# Patient Record
Sex: FEMALE | Race: WHITE | NOT HISPANIC OR LATINO
[De-identification: names, ages, dates, MRNs, and addresses within clinical notes are randomized per-mention and may not be internally consistent; named-entity substitution may affect disease eponyms.]

---

## 2017-08-07 ENCOUNTER — RESULT CHARGE (OUTPATIENT)
Age: 52
End: 2017-08-07

## 2017-08-08 ENCOUNTER — APPOINTMENT (OUTPATIENT)
Dept: FAMILY MEDICINE | Facility: CLINIC | Age: 52
End: 2017-08-08
Payer: COMMERCIAL

## 2017-08-08 ENCOUNTER — NON-APPOINTMENT (OUTPATIENT)
Age: 52
End: 2017-08-08

## 2017-08-08 VITALS
HEIGHT: 63 IN | SYSTOLIC BLOOD PRESSURE: 110 MMHG | DIASTOLIC BLOOD PRESSURE: 80 MMHG | OXYGEN SATURATION: 99 % | HEART RATE: 61 BPM | BODY MASS INDEX: 26.93 KG/M2 | WEIGHT: 152 LBS

## 2017-08-08 DIAGNOSIS — Z78.9 OTHER SPECIFIED HEALTH STATUS: ICD-10-CM

## 2017-08-08 DIAGNOSIS — Z82.0 FAMILY HISTORY OF EPILEPSY AND OTHER DISEASES OF THE NERVOUS SYSTEM: ICD-10-CM

## 2017-08-08 DIAGNOSIS — Z82.49 FAMILY HISTORY OF ISCHEMIC HEART DISEASE AND OTHER DISEASES OF THE CIRCULATORY SYSTEM: ICD-10-CM

## 2017-08-08 PROCEDURE — 93000 ELECTROCARDIOGRAM COMPLETE: CPT

## 2017-08-08 PROCEDURE — 99386 PREV VISIT NEW AGE 40-64: CPT | Mod: 25

## 2017-08-08 RX ORDER — CHOLECALCIFEROL (VITAMIN D3) 1250 MCG
1.25 MG CAPSULE ORAL
Qty: 4 | Refills: 0 | Status: ACTIVE | COMMUNITY

## 2017-08-08 RX ORDER — LEVOTHYROXINE SODIUM 100 UG/1
100 TABLET ORAL DAILY
Qty: 90 | Refills: 3 | Status: ACTIVE | COMMUNITY

## 2017-08-08 RX ORDER — LIOTHYRONINE SODIUM 5 UG/1
5 TABLET ORAL DAILY
Refills: 0 | Status: ACTIVE | COMMUNITY

## 2017-12-20 ENCOUNTER — APPOINTMENT (OUTPATIENT)
Dept: FAMILY MEDICINE | Facility: CLINIC | Age: 52
End: 2017-12-20
Payer: COMMERCIAL

## 2017-12-20 VITALS
DIASTOLIC BLOOD PRESSURE: 80 MMHG | SYSTOLIC BLOOD PRESSURE: 120 MMHG | HEIGHT: 63 IN | WEIGHT: 152 LBS | BODY MASS INDEX: 26.93 KG/M2

## 2017-12-20 DIAGNOSIS — T75.3XXA MOTION SICKNESS, INITIAL ENCOUNTER: ICD-10-CM

## 2017-12-20 PROCEDURE — 99213 OFFICE O/P EST LOW 20 MIN: CPT

## 2018-01-02 ENCOUNTER — TRANSCRIPTION ENCOUNTER (OUTPATIENT)
Age: 53
End: 2018-01-02

## 2018-10-21 ENCOUNTER — TRANSCRIPTION ENCOUNTER (OUTPATIENT)
Age: 53
End: 2018-10-21

## 2018-11-05 ENCOUNTER — APPOINTMENT (OUTPATIENT)
Dept: FAMILY MEDICINE | Facility: CLINIC | Age: 53
End: 2018-11-05
Payer: COMMERCIAL

## 2018-11-05 VITALS
WEIGHT: 155 LBS | HEIGHT: 63 IN | DIASTOLIC BLOOD PRESSURE: 80 MMHG | TEMPERATURE: 98 F | BODY MASS INDEX: 27.46 KG/M2 | SYSTOLIC BLOOD PRESSURE: 116 MMHG

## 2018-11-05 DIAGNOSIS — K21.9 GASTRO-ESOPHAGEAL REFLUX DISEASE W/OUT ESOPHAGITIS: ICD-10-CM

## 2018-11-05 PROCEDURE — 99213 OFFICE O/P EST LOW 20 MIN: CPT

## 2018-11-05 RX ORDER — SCOPOLAMINE 1.5 MG/1
1 PATCH, EXTENDED RELEASE TRANSDERMAL
Qty: 5 | Refills: 0 | Status: DISCONTINUED | COMMUNITY
Start: 2017-12-20 | End: 2018-11-05

## 2018-11-05 NOTE — PLAN
[FreeTextEntry1] : Sinusitis- start zpak, flonase and decongestants and supportive measures\par Epigastric pain possibly secondary to GERD. If pain does not self resolve in 1-2 weeks start ranitidine daily

## 2018-11-05 NOTE — REVIEW OF SYSTEMS
[Fatigue] : fatigue [Earache] : earache [Nasal Discharge] : nasal discharge [Sore Throat] : sore throat [Postnasal Drip] : postnasal drip [Fever] : no fever [Chills] : no chills [Chest Pain] : no chest pain [Palpitations] : no palpitations [Shortness Of Breath] : no shortness of breath [Wheezing] : no wheezing [Headache] : no headache [Dizziness] : no dizziness

## 2018-11-05 NOTE — HISTORY OF PRESENT ILLNESS
[FreeTextEntry8] : 1 week history of not feeling well associated with headache, facial pressure, ear pain and  sore throat. Also c/o epigastric pain, no hx of gerd. Denies association with food, admits to drinking coffee.  Denies fever, chills. States she is not feeling herself.

## 2018-11-05 NOTE — PHYSICAL EXAM
[No Acute Distress] : no acute distress [Well-Appearing] : well-appearing [Normal Outer Ear/Nose] : the outer ears and nose were normal in appearance [Supple] : supple [No Respiratory Distress] : no respiratory distress  [Clear to Auscultation] : lungs were clear to auscultation bilaterally [Normal Rate] : normal rate  [Regular Rhythm] : with a regular rhythm [Soft] : abdomen soft [Non Tender] : non-tender [Non-distended] : non-distended [Normal Bowel Sounds] : normal bowel sounds [Normal Affect] : the affect was normal [Normal Insight/Judgement] : insight and judgment were intact [de-identified] : + pain on palpation to frontal and maxillary sinuses, + post nasal drip

## 2018-12-10 ENCOUNTER — RX RENEWAL (OUTPATIENT)
Age: 53
End: 2018-12-10

## 2019-03-25 ENCOUNTER — APPOINTMENT (OUTPATIENT)
Dept: FAMILY MEDICINE | Facility: CLINIC | Age: 54
End: 2019-03-25
Payer: COMMERCIAL

## 2019-03-25 VITALS
HEIGHT: 63 IN | SYSTOLIC BLOOD PRESSURE: 124 MMHG | DIASTOLIC BLOOD PRESSURE: 78 MMHG | BODY MASS INDEX: 27.46 KG/M2 | RESPIRATION RATE: 16 BRPM | TEMPERATURE: 98.8 F | OXYGEN SATURATION: 99 % | HEART RATE: 116 BPM | WEIGHT: 155 LBS

## 2019-03-25 DIAGNOSIS — L08.9 BLISTER (NONTHERMAL), UNSPECIFIED FOOT, INITIAL ENCOUNTER: ICD-10-CM

## 2019-03-25 DIAGNOSIS — S90.829A BLISTER (NONTHERMAL), UNSPECIFIED FOOT, INITIAL ENCOUNTER: ICD-10-CM

## 2019-03-25 PROCEDURE — 99213 OFFICE O/P EST LOW 20 MIN: CPT

## 2019-03-25 NOTE — HISTORY OF PRESENT ILLNESS
[FreeTextEntry8] : wearing poor fitting shoes had area of breakdown top of right foot which has not healed local redness swelling

## 2019-06-09 ENCOUNTER — TRANSCRIPTION ENCOUNTER (OUTPATIENT)
Age: 54
End: 2019-06-09

## 2019-07-01 ENCOUNTER — NON-APPOINTMENT (OUTPATIENT)
Age: 54
End: 2019-07-01

## 2019-07-01 ENCOUNTER — APPOINTMENT (OUTPATIENT)
Dept: FAMILY MEDICINE | Facility: CLINIC | Age: 54
End: 2019-07-01
Payer: COMMERCIAL

## 2019-07-01 ENCOUNTER — TRANSCRIPTION ENCOUNTER (OUTPATIENT)
Age: 54
End: 2019-07-01

## 2019-07-01 VITALS
HEART RATE: 66 BPM | OXYGEN SATURATION: 98 % | SYSTOLIC BLOOD PRESSURE: 124 MMHG | BODY MASS INDEX: 28.39 KG/M2 | HEIGHT: 63 IN | WEIGHT: 160.25 LBS | DIASTOLIC BLOOD PRESSURE: 70 MMHG

## 2019-07-01 DIAGNOSIS — E06.3 AUTOIMMUNE THYROIDITIS: ICD-10-CM

## 2019-07-01 DIAGNOSIS — F40.243 FEAR OF FLYING: ICD-10-CM

## 2019-07-01 DIAGNOSIS — Z00.00 ENCOUNTER FOR GENERAL ADULT MEDICAL EXAMINATION W/OUT ABNORMAL FINDINGS: ICD-10-CM

## 2019-07-01 PROCEDURE — 93000 ELECTROCARDIOGRAM COMPLETE: CPT

## 2019-07-01 PROCEDURE — 99396 PREV VISIT EST AGE 40-64: CPT | Mod: 25

## 2019-07-01 PROCEDURE — G0444 DEPRESSION SCREEN ANNUAL: CPT

## 2019-07-01 RX ORDER — FLUTICASONE PROPIONATE 50 UG/1
50 SPRAY, METERED NASAL DAILY
Qty: 1 | Refills: 0 | Status: DISCONTINUED | COMMUNITY
Start: 2018-11-05 | End: 2019-07-01

## 2019-07-01 RX ORDER — RANITIDINE 150 MG/1
150 TABLET ORAL
Qty: 30 | Refills: 0 | Status: DISCONTINUED | COMMUNITY
Start: 2018-11-05 | End: 2019-07-01

## 2019-07-01 RX ORDER — AZITHROMYCIN 250 MG/1
250 TABLET, FILM COATED ORAL DAILY
Qty: 1 | Refills: 0 | Status: DISCONTINUED | COMMUNITY
Start: 2018-11-05 | End: 2019-07-01

## 2019-07-01 RX ORDER — CEFADROXIL 500 MG/1
500 CAPSULE ORAL TWICE DAILY
Qty: 20 | Refills: 0 | Status: DISCONTINUED | COMMUNITY
Start: 2019-03-25 | End: 2019-07-01

## 2019-07-01 NOTE — ASSESSMENT
[FreeTextEntry1] : Left shoulder pain may be combination of some cervical radiculopathy and impingement syndrome physical therapy prescribed.\par \par Colonoscopy prescribed.\par \par Xanax for air travel

## 2019-07-01 NOTE — PHYSICAL EXAM
[No Acute Distress] : no acute distress [No Lymphadenopathy] : no lymphadenopathy [Thyroid Normal, No Nodules] : the thyroid was normal and there were no nodules present [Clear to Auscultation] : lungs were clear to auscultation bilaterally [Regular Rhythm] : with a regular rhythm [No Carotid Bruits] : no carotid bruits [No Edema] : there was no peripheral edema [Soft] : abdomen soft [No HSM] : no HSM [Normal Posterior Cervical Nodes] : no posterior cervical lymphadenopathy [Normal Anterior Cervical Nodes] : no anterior cervical lymphadenopathy [No Rash] : no rash [Normal Insight/Judgement] : insight and judgment were intact [de-identified] : Examination . Left shoulder normal [de-identified] : Decrease pronator deep tendon reflex left arm

## 2019-07-01 NOTE — HISTORY OF PRESENT ILLNESS
[FreeTextEntry1] : Yearly [de-identified] : Followed by endocrinology for hypothyroidism. Dose recently adjusted by Dr. Nielson.\par \par History of right shoulder surgery, now developing left shoulder discomfort. Good range of motion of shoulder pain sometimes radiates down to lateral epicondyles and wrist. Does not seem to be associated with neck problems.\par \par Elevated triglycerides on a nonfasting blood sample\par \par Uses Xanax for air travel

## 2019-07-01 NOTE — HEALTH RISK ASSESSMENT
[Excellent] : ~his/her~  mood as  excellent [No] : No [0] : 2) Feeling down, depressed, or hopeless: Not at all (0) [Change in mental status noted] : No change in mental status noted [Fully functional (bathing, dressing, toileting, transferring, walking, feeding)] : Fully functional (bathing, dressing, toileting, transferring, walking, feeding) [Fully functional (using the telephone, shopping, preparing meals, housekeeping, doing laundry, using] : Fully functional and needs no help or supervision to perform IADLs (using the telephone, shopping, preparing meals, housekeeping, doing laundry, using transportation, managing medications and managing finances) [ColonoscopyComments] : Colonoscopy ordered

## 2019-07-02 LAB
CHOLEST SERPL-MCNC: 197 MG/DL
CHOLEST/HDLC SERPL: 4.4 RATIO
HDLC SERPL-MCNC: 45 MG/DL
LDLC SERPL CALC-MCNC: 124 MG/DL
TRIGL SERPL-MCNC: 141 MG/DL

## 2019-09-09 ENCOUNTER — TRANSCRIPTION ENCOUNTER (OUTPATIENT)
Age: 54
End: 2019-09-09

## 2019-09-12 ENCOUNTER — APPOINTMENT (OUTPATIENT)
Dept: FAMILY MEDICINE | Facility: CLINIC | Age: 54
End: 2019-09-12
Payer: OTHER MISCELLANEOUS

## 2019-09-12 VITALS
DIASTOLIC BLOOD PRESSURE: 70 MMHG | WEIGHT: 160 LBS | OXYGEN SATURATION: 99 % | SYSTOLIC BLOOD PRESSURE: 110 MMHG | HEIGHT: 63 IN | TEMPERATURE: 98.7 F | RESPIRATION RATE: 16 BRPM | HEART RATE: 86 BPM | BODY MASS INDEX: 28.35 KG/M2

## 2019-09-12 DIAGNOSIS — R58 HEMORRHAGE, NOT ELSEWHERE CLASSIFIED: ICD-10-CM

## 2019-09-12 PROCEDURE — 99203 OFFICE O/P NEW LOW 30 MIN: CPT

## 2019-09-15 RX ORDER — OXYCODONE AND ACETAMINOPHEN 5; 325 MG/1; MG/1
5-325 TABLET ORAL
Qty: 12 | Refills: 0 | Status: COMPLETED | COMMUNITY
Start: 2019-06-09

## 2019-09-15 RX ORDER — ICOSAPENT ETHYL 1000 MG/1
1 CAPSULE ORAL
Qty: 360 | Refills: 0 | Status: COMPLETED | COMMUNITY
Start: 2019-06-10

## 2019-09-15 RX ORDER — PHENTERMINE AND TOPIRAMATE 15; 92 MG/1; MG/1
15-92 CAPSULE, EXTENDED RELEASE ORAL
Qty: 30 | Refills: 0 | Status: COMPLETED | COMMUNITY
Start: 2019-02-13

## 2019-09-15 RX ORDER — ONDANSETRON 4 MG/1
4 TABLET, ORALLY DISINTEGRATING ORAL
Qty: 10 | Refills: 0 | Status: COMPLETED | COMMUNITY
Start: 2019-07-22

## 2019-09-15 RX ORDER — ERGOCALCIFEROL 1.25 MG/1
1.25 MG CAPSULE, LIQUID FILLED ORAL
Qty: 12 | Refills: 0 | Status: COMPLETED | COMMUNITY
Start: 2019-04-04

## 2019-09-15 RX ORDER — LEVOTHYROXINE SODIUM 112 UG/1
112 TABLET ORAL
Qty: 90 | Refills: 0 | Status: COMPLETED | COMMUNITY
Start: 2019-04-10

## 2019-09-15 RX ORDER — VALACYCLOVIR 1 G/1
1 TABLET, FILM COATED ORAL
Qty: 21 | Refills: 0 | Status: COMPLETED | COMMUNITY
Start: 2019-06-09

## 2019-09-15 NOTE — HISTORY OF PRESENT ILLNESS
[FreeTextEntry8] : Pt for new w/c case. DOI 9/9/19, pt was going down stairs in school building during a fire drill and fell forward from losing her footing. Pt was holding onto banister w/ L hand and did not let go of banister leading to hyp extension of arm. Pt also twisted while falling and hit R lower leg. Pt now c/o neck, upper back, anterior chest pain and b/l rib pain. Pt went to urgent care that day who started pt on ibuprofen 800mg. Pt has been taking it at night. Pain has worsened, pt having worse pain whenrotation torso or neck or taking deep breaths.. CXR was wnl at urgent care.

## 2019-09-15 NOTE — PHYSICAL EXAM
[No Lymphadenopathy] : no lymphadenopathy [Normal] : soft, non-tender, non-distended, no masses palpated, no HSM and normal bowel sounds [de-identified] : anterior chest wall and b/l anterolateral lower rib tenderness, +neck muscle spasms b/l [de-identified] : ecchymoses on RLE and R arm

## 2019-09-15 NOTE — ASSESSMENT
[FreeTextEntry1] : costochondritis, back spasms, ecchymosis - advised warm compresses, start nsaids and flexeril TID. Possible adverse effects discussed with pt. f/u in 1 week to reevaluate.

## 2019-09-19 ENCOUNTER — APPOINTMENT (OUTPATIENT)
Dept: FAMILY MEDICINE | Facility: CLINIC | Age: 54
End: 2019-09-19
Payer: OTHER MISCELLANEOUS

## 2019-09-19 VITALS
OXYGEN SATURATION: 99 % | BODY MASS INDEX: 28.35 KG/M2 | RESPIRATION RATE: 16 BRPM | HEART RATE: 69 BPM | WEIGHT: 160 LBS | HEIGHT: 63 IN | DIASTOLIC BLOOD PRESSURE: 80 MMHG | SYSTOLIC BLOOD PRESSURE: 120 MMHG

## 2019-09-19 DIAGNOSIS — M94.0 CHONDROCOSTAL JUNCTION SYNDROME [TIETZE]: ICD-10-CM

## 2019-09-19 PROCEDURE — 99214 OFFICE O/P EST MOD 30 MIN: CPT

## 2019-09-19 NOTE — PHYSICAL EXAM
[Normal] : normal rate, regular rhythm, normal S1 and S2 and no murmur heard [No Spinal Tenderness] : no spinal tenderness [No Focal Deficits] : no focal deficits [de-identified] : severe point tenderness along anterolateral ribs near L axilla. Pain w/ resisted abduction or resisted adduction of L arm. minimal spasms of neck/upper back improved [de-identified] : ecchymoses resolved [de-identified] : tearful on exam

## 2019-09-19 NOTE — ASSESSMENT
[FreeTextEntry1] : costochondritis, suspect pectoralis strain or tear - pt is in severe pain, refer to ortho for eval. start medrol layne, start tramadol prn severe pain. Possible adverse effects discussed with pt\par back/neck spasms - improved significantly. flexeril prn

## 2019-09-19 NOTE — HISTORY OF PRESENT ILLNESS
[de-identified] : Pt for f/u w/c case DOI 9/9/19. costochondritis pain, back spasms. Pt spasms improved but still in pain under breasts and on b/l ribs. Pt taking ibuprofen 800mg bid. Pain worse w/ laying down or certain rotational positions of torso/neck 9-10/10 severity at times. Flexeril helped w/ spasms of neck/upper back. Pt tearful in office today from pain

## 2019-10-04 ENCOUNTER — APPOINTMENT (OUTPATIENT)
Dept: FAMILY MEDICINE | Facility: CLINIC | Age: 54
End: 2019-10-04
Payer: OTHER MISCELLANEOUS

## 2019-10-04 VITALS
RESPIRATION RATE: 16 BRPM | HEIGHT: 63 IN | WEIGHT: 160 LBS | BODY MASS INDEX: 28.35 KG/M2 | TEMPERATURE: 98.9 F | HEART RATE: 71 BPM | SYSTOLIC BLOOD PRESSURE: 112 MMHG | OXYGEN SATURATION: 98 % | DIASTOLIC BLOOD PRESSURE: 70 MMHG

## 2019-10-04 PROCEDURE — 99213 OFFICE O/P EST LOW 20 MIN: CPT

## 2019-10-04 NOTE — ASSESSMENT
[FreeTextEntry1] : multiple L rib fractures - start PT, return to work on 10/21/19 as per trauma surgeon.

## 2019-10-04 NOTE — PHYSICAL EXAM
[No Spinal Tenderness] : no spinal tenderness [No Focal Deficits] : no focal deficits [Normal] : affect was normal and insight and judgment were intact [de-identified] : point tenderness along anterolateral ribs near L axilla. Pain w/ resisted abduction or resisted adduction of L arm. spasms of neck/upper back improved [de-identified] : ecchymoses resolved

## 2019-10-04 NOTE — HISTORY OF PRESENT ILLNESS
[FreeTextEntry8] : Pt for f/u w/c case DOI 9/9/19, pt was going down stairs in school building during a fire drill and fell forward from losing her footing. Pt was holding onto banister w/ L hand and did not let go of banister leading to hyp extension of arm. Pt has since f/u with trauma surgeon, CT chest showed L sided rib fx x 3. Pt condition improving. \par Trauma surgeon cleared pt to go back to work 10/21/19 as long as condition improves.

## 2019-10-10 ENCOUNTER — APPOINTMENT (OUTPATIENT)
Dept: FAMILY MEDICINE | Facility: CLINIC | Age: 54
End: 2019-10-10
Payer: COMMERCIAL

## 2019-10-10 ENCOUNTER — APPOINTMENT (OUTPATIENT)
Dept: FAMILY MEDICINE | Facility: CLINIC | Age: 54
End: 2019-10-10
Payer: OTHER MISCELLANEOUS

## 2019-10-10 VITALS
WEIGHT: 161.13 LBS | HEART RATE: 98 BPM | RESPIRATION RATE: 16 BRPM | SYSTOLIC BLOOD PRESSURE: 118 MMHG | BODY MASS INDEX: 28.55 KG/M2 | HEIGHT: 63 IN | DIASTOLIC BLOOD PRESSURE: 68 MMHG | OXYGEN SATURATION: 99 %

## 2019-10-10 VITALS
BODY MASS INDEX: 28.53 KG/M2 | WEIGHT: 161 LBS | OXYGEN SATURATION: 99 % | RESPIRATION RATE: 16 BRPM | HEIGHT: 63 IN | SYSTOLIC BLOOD PRESSURE: 118 MMHG | HEART RATE: 98 BPM | DIASTOLIC BLOOD PRESSURE: 68 MMHG

## 2019-10-10 DIAGNOSIS — M54.6 PAIN IN THORACIC SPINE: ICD-10-CM

## 2019-10-10 DIAGNOSIS — R06.00 DYSPNEA, UNSPECIFIED: ICD-10-CM

## 2019-10-10 DIAGNOSIS — S29.011A STRAIN OF MUSCLE AND TENDON OF FRONT WALL OF THORAX, INITIAL ENCOUNTER: ICD-10-CM

## 2019-10-10 DIAGNOSIS — Z87.09 PERSONAL HISTORY OF OTHER DISEASES OF THE RESPIRATORY SYSTEM: ICD-10-CM

## 2019-10-10 PROCEDURE — 99214 OFFICE O/P EST MOD 30 MIN: CPT

## 2019-10-10 NOTE — HISTORY OF PRESENT ILLNESS
[FreeTextEntry8] : Pt c/o sinus headache/pain x 8+ days. Pt has tried fluticasone sprays bid which have not helped. Pressure has worsened and pt feels fatigued.

## 2019-10-10 NOTE — HISTORY OF PRESENT ILLNESS
[de-identified] : Pt for f/u w/c case DOI 9/9/19, pt was going down stairs in school building during a fire drill and fell forward from losing her footing. Pt was holding onto banister w/ L hand and did not let go of banister leading to hyper extension of arm. Pt has since f/u with trauma surgeon, CT chest showed L sided rib fx x 3. Trauma surgeon cleared pt to go back to work 10/21/19 as long as condition improves. \par \par Pt c/o continued pain L lateral ribs, radiating into L upper arm. Pt feeling she needs to take frequent deep breaths to catch her breath. Pain has been stable but slightly worse when doing abduction/flexion exercises of L shoulder w/ PT.

## 2019-10-10 NOTE — ASSESSMENT
[FreeTextEntry1] : acute sinusitis - cont fluticasone bid. Increase po fluid intake to maintain adequate hydration. Rest. Pt has not tolerated oral steroids in the past so will avoid medrol layne at this time. Start course of antibx if no improvement in symptoms

## 2019-10-10 NOTE — REVIEW OF SYSTEMS
[Shortness Of Breath] : shortness of breath [Wheezing] : no wheezing [Cough] : no cough [Dyspnea on Exertion] : no dyspnea on exertion [Negative] : Cardiovascular [FreeTextEntry9] : see hpi

## 2019-10-10 NOTE — PHYSICAL EXAM
[No Focal Deficits] : no focal deficits [No Spinal Tenderness] : no spinal tenderness [Normal] : affect was normal and insight and judgment were intact [de-identified] : point tenderness along anterolateral ribs near L axilla w/ radiation of pain into axilla. Pain w/ resisted abduction or resisted adduction of L arm. tenderness of L paraspinal muscles of thoracic paraspinals

## 2019-10-10 NOTE — ASSESSMENT
[FreeTextEntry1] : multiple L rib fractures, dyspnea, pectoralis muscle strain/pain, thoracic back pain - incentive spirometer prescribed to pt and advised pt on appropriate use. f/u with trauma surgeon, appt 10/14/19. MRI chest ordered to examine possible pectoralis strain vs complete tear. Cont nsaids or tylenol prn pain.

## 2019-11-12 ENCOUNTER — APPOINTMENT (OUTPATIENT)
Dept: FAMILY MEDICINE | Facility: CLINIC | Age: 54
End: 2019-11-12
Payer: OTHER MISCELLANEOUS

## 2019-11-12 ENCOUNTER — APPOINTMENT (OUTPATIENT)
Dept: FAMILY MEDICINE | Facility: CLINIC | Age: 54
End: 2019-11-12
Payer: COMMERCIAL

## 2019-11-12 VITALS
HEIGHT: 63 IN | BODY MASS INDEX: 28.35 KG/M2 | WEIGHT: 160 LBS | OXYGEN SATURATION: 99 % | DIASTOLIC BLOOD PRESSURE: 78 MMHG | SYSTOLIC BLOOD PRESSURE: 120 MMHG | RESPIRATION RATE: 16 BRPM | HEART RATE: 87 BPM

## 2019-11-12 DIAGNOSIS — M54.2 CERVICALGIA: ICD-10-CM

## 2019-11-12 DIAGNOSIS — S22.42XA MULTIPLE FRACTURES OF RIBS, LEFT SIDE, INITIAL ENCOUNTER FOR CLOSED FRACTURE: ICD-10-CM

## 2019-11-12 DIAGNOSIS — M25.512 PAIN IN LEFT SHOULDER: ICD-10-CM

## 2019-11-12 DIAGNOSIS — Z23 ENCOUNTER FOR IMMUNIZATION: ICD-10-CM

## 2019-11-12 DIAGNOSIS — M62.830 MUSCLE SPASM OF BACK: ICD-10-CM

## 2019-11-12 PROCEDURE — 90686 IIV4 VACC NO PRSV 0.5 ML IM: CPT

## 2019-11-12 PROCEDURE — G0008: CPT

## 2019-11-12 PROCEDURE — 99214 OFFICE O/P EST MOD 30 MIN: CPT | Mod: 25

## 2019-11-12 RX ORDER — AMOXICILLIN AND CLAVULANATE POTASSIUM 875; 125 MG/1; MG/1
875-125 TABLET, COATED ORAL
Qty: 20 | Refills: 0 | Status: COMPLETED | COMMUNITY
Start: 2019-10-10 | End: 2019-11-12

## 2019-11-12 RX ORDER — FLUTICASONE PROPIONATE 50 UG/1
50 SPRAY, METERED NASAL TWICE DAILY
Qty: 1 | Refills: 0 | Status: COMPLETED | COMMUNITY
Start: 2019-10-04 | End: 2019-11-12

## 2019-11-12 RX ORDER — HYDROCODONE BITARTRATE AND HOMATROPINE METHYLBROMIDE 5; 1.5 MG/5ML; MG/5ML
5-1.5 SYRUP ORAL
Qty: 120 | Refills: 0 | Status: COMPLETED | COMMUNITY
Start: 2019-10-18 | End: 2019-11-12

## 2019-11-12 NOTE — HISTORY OF PRESENT ILLNESS
[de-identified] : Risks and benefits of flu vaccine discussed w/ pt. Consent given by pt, flu vaccine administered. Pt tolerated well

## 2019-11-13 PROBLEM — S22.42XA FRACTURE OF MULTIPLE RIBS OF LEFT SIDE: Status: ACTIVE | Noted: 2019-10-04

## 2019-11-13 NOTE — HISTORY OF PRESENT ILLNESS
[de-identified] : Pt for f/u w/c case DOI 9/9/19, pt was going down stairs in school building during a fire drill and fell forward from losing her footing. Pt was holding onto banister w/ L hand and did not let go of banister leading to hyper extension of arm. Pt has f/u with trauma surgeon Dr. Curtis, CT chest showed L sided rib fx x 3. Rib pain has improved w/ pt going to physical therapy 2-3 x a week.\par Pt still having persistent neck pain with rotation to L and extension which radiates up into head. Pt's leave extended by Dr. Curtis until she is evaluated by ortho spine Dr. Cristian Dinero on 11/25/19.

## 2019-11-13 NOTE — PHYSICAL EXAM
[No Spinal Tenderness] : no spinal tenderness [No Focal Deficits] : no focal deficits [Normal] : affect was normal and insight and judgment were intact [de-identified] : point tenderness along anterolateral ribs near L axilla w/ radiation of pain into axilla. tenderness of L paraspinal muscles of thoracic paraspinals, spasms L lower cervical paraspinals and L trapezius muscle. +Spurling maneuver

## 2019-11-13 NOTE — ASSESSMENT
[FreeTextEntry1] : multiple L rib fractures, muscle spasms, neck and back pain - Cont PT and nsaids or tylenol prn pain. Restart flexeril prn spasms. Pt has seen trauma surgeon who suggests continued leave until evaluated by ortho spine specialist on 11/25/19.

## 2020-01-24 ENCOUNTER — APPOINTMENT (OUTPATIENT)
Dept: DERMATOLOGY | Facility: CLINIC | Age: 55
End: 2020-01-24

## 2020-05-07 ENCOUNTER — APPOINTMENT (OUTPATIENT)
Dept: FAMILY MEDICINE | Facility: CLINIC | Age: 55
End: 2020-05-07
Payer: COMMERCIAL

## 2020-05-07 DIAGNOSIS — L08.9 LOCAL INFECTION OF THE SKIN AND SUBCUTANEOUS TISSUE, UNSPECIFIED: ICD-10-CM

## 2020-05-07 PROCEDURE — 99213 OFFICE O/P EST LOW 20 MIN: CPT | Mod: 95

## 2020-05-21 ENCOUNTER — APPOINTMENT (OUTPATIENT)
Dept: ORTHOPEDIC SURGERY | Facility: CLINIC | Age: 55
End: 2020-05-21
Payer: COMMERCIAL

## 2020-05-21 VITALS
WEIGHT: 160 LBS | HEART RATE: 67 BPM | BODY MASS INDEX: 28.35 KG/M2 | SYSTOLIC BLOOD PRESSURE: 117 MMHG | DIASTOLIC BLOOD PRESSURE: 80 MMHG | HEIGHT: 63 IN

## 2020-05-21 DIAGNOSIS — Z86.39 PERSONAL HISTORY OF OTHER ENDOCRINE, NUTRITIONAL AND METABOLIC DISEASE: ICD-10-CM

## 2020-05-21 DIAGNOSIS — M65.322 TRIGGER FINGER, LEFT INDEX FINGER: ICD-10-CM

## 2020-05-21 PROCEDURE — 20550 NJX 1 TENDON SHEATH/LIGAMENT: CPT | Mod: F1

## 2020-05-21 PROCEDURE — 99204 OFFICE O/P NEW MOD 45 MIN: CPT | Mod: 25

## 2020-12-21 PROBLEM — Z87.09 HISTORY OF ACUTE SINUSITIS: Status: RESOLVED | Noted: 2018-11-05 | Resolved: 2020-12-21

## 2021-05-10 ENCOUNTER — NON-APPOINTMENT (OUTPATIENT)
Age: 56
End: 2021-05-10

## 2021-05-10 ENCOUNTER — APPOINTMENT (OUTPATIENT)
Dept: ORTHOPEDIC SURGERY | Facility: CLINIC | Age: 56
End: 2021-05-10
Payer: COMMERCIAL

## 2021-05-10 VITALS
HEART RATE: 70 BPM | BODY MASS INDEX: 28.35 KG/M2 | WEIGHT: 160 LBS | SYSTOLIC BLOOD PRESSURE: 115 MMHG | DIASTOLIC BLOOD PRESSURE: 81 MMHG | HEIGHT: 63 IN

## 2021-05-10 DIAGNOSIS — M19.042 PRIMARY OSTEOARTHRITIS, LEFT HAND: ICD-10-CM

## 2021-05-10 DIAGNOSIS — M79.642 PAIN IN LEFT HAND: ICD-10-CM

## 2021-05-10 PROCEDURE — 99213 OFFICE O/P EST LOW 20 MIN: CPT

## 2021-05-10 PROCEDURE — 99072 ADDL SUPL MATRL&STAF TM PHE: CPT

## 2021-05-10 PROCEDURE — 73130 X-RAY EXAM OF HAND: CPT | Mod: LT

## 2021-08-26 ENCOUNTER — APPOINTMENT (OUTPATIENT)
Dept: FAMILY MEDICINE | Facility: CLINIC | Age: 56
End: 2021-08-26
Payer: COMMERCIAL

## 2021-08-26 DIAGNOSIS — R09.81 NASAL CONGESTION: ICD-10-CM

## 2021-08-26 PROCEDURE — 99442: CPT

## 2021-08-29 ENCOUNTER — TRANSCRIPTION ENCOUNTER (OUTPATIENT)
Age: 56
End: 2021-08-29

## 2021-09-30 ENCOUNTER — APPOINTMENT (OUTPATIENT)
Dept: FAMILY MEDICINE | Facility: CLINIC | Age: 56
End: 2021-09-30

## 2021-11-17 ENCOUNTER — NON-APPOINTMENT (OUTPATIENT)
Age: 56
End: 2021-11-17

## 2021-11-17 ENCOUNTER — APPOINTMENT (OUTPATIENT)
Dept: OBGYN | Facility: CLINIC | Age: 56
End: 2021-11-17
Payer: COMMERCIAL

## 2021-11-17 VITALS
SYSTOLIC BLOOD PRESSURE: 120 MMHG | BODY MASS INDEX: 29.77 KG/M2 | HEIGHT: 63 IN | DIASTOLIC BLOOD PRESSURE: 78 MMHG | WEIGHT: 168 LBS

## 2021-11-17 DIAGNOSIS — Z87.2 PERSONAL HISTORY OF DISEASES OF THE SKIN AND SUBCUTANEOUS TISSUE: ICD-10-CM

## 2021-11-17 PROCEDURE — 99386 PREV VISIT NEW AGE 40-64: CPT

## 2021-11-17 NOTE — HISTORY OF PRESENT ILLNESS
[FreeTextEntry1] : 56 year old female presents for wwe.  She is a new patient to our practice.  Her last annual exam was 1 year ago.  She has no complaints today.  Menarche was at age 15.  She went 10 months without her menses and LMP was in 2021.  She has mild hot flashes but denies other menopausal symptoms.  She has no history of ovarian cysts, fibroids, endometriosis, STI's or abnormal pap's.  She has a history of a breast biopsy in  that was benign.  She is due for a mammo and breast sono.  She is up to date with her colonoscopy.  \par \par PMH is significant for hashimoto's thyroiditis and psoriatic arthritis.  PSH is significant for a shoulder surgery and D&C.  Her family history os non-contributory.  She denies tobacco, alcohol and illicit drugs.  Gyn history as above.  OB history:  (FT VD x2, SAB x1, Ectopic x1).  NKDA.  she takes otezla, synthroid, cytomel and vitamin D.

## 2021-11-17 NOTE — PLAN
[FreeTextEntry1] : 56 year old female wwe\par \par 1. Pap done\par 2. Rx screening mammo and sono (history of dense breast tissue)\par 3. Counseled on perimenopause/ menopause\par 4. Up to date with colonoscopy\par 5. Annual exam in 1 year

## 2021-11-23 LAB
CYTOLOGY CVX/VAG DOC THIN PREP: NORMAL
HPV HIGH+LOW RISK DNA PNL CVX: NOT DETECTED

## 2022-10-10 ENCOUNTER — APPOINTMENT (OUTPATIENT)
Dept: GASTROENTEROLOGY | Facility: CLINIC | Age: 57
End: 2022-10-10

## 2022-10-10 VITALS
HEART RATE: 68 BPM | SYSTOLIC BLOOD PRESSURE: 116 MMHG | OXYGEN SATURATION: 99 % | RESPIRATION RATE: 14 BRPM | WEIGHT: 169 LBS | DIASTOLIC BLOOD PRESSURE: 76 MMHG | BODY MASS INDEX: 29.95 KG/M2 | HEIGHT: 63 IN

## 2022-10-10 DIAGNOSIS — K63.5 POLYP OF COLON: ICD-10-CM

## 2022-10-10 DIAGNOSIS — Z12.11 ENCOUNTER FOR SCREENING FOR MALIGNANT NEOPLASM OF COLON: ICD-10-CM

## 2022-10-10 PROCEDURE — 82272 OCCULT BLD FECES 1-3 TESTS: CPT

## 2022-10-10 PROCEDURE — 99203 OFFICE O/P NEW LOW 30 MIN: CPT

## 2022-10-10 RX ORDER — MELOXICAM 15 MG/1
15 TABLET ORAL DAILY
Qty: 14 | Refills: 0 | Status: DISCONTINUED | COMMUNITY
Start: 2020-05-21 | End: 2022-10-10

## 2022-10-10 RX ORDER — CEFADROXIL 500 MG/1
500 CAPSULE ORAL TWICE DAILY
Qty: 20 | Refills: 0 | Status: DISCONTINUED | COMMUNITY
Start: 2020-05-07 | End: 2022-10-10

## 2022-10-10 RX ORDER — MELOXICAM 15 MG/1
15 TABLET ORAL DAILY
Qty: 14 | Refills: 0 | Status: DISCONTINUED | COMMUNITY
Start: 2021-05-10 | End: 2022-10-10

## 2022-10-10 RX ORDER — CYCLOBENZAPRINE HYDROCHLORIDE 5 MG/1
5 TABLET, FILM COATED ORAL 3 TIMES DAILY
Qty: 30 | Refills: 0 | Status: DISCONTINUED | COMMUNITY
Start: 2019-11-12 | End: 2022-10-10

## 2022-10-10 RX ORDER — AZITHROMYCIN 250 MG/1
250 TABLET, FILM COATED ORAL
Qty: 1 | Refills: 0 | Status: DISCONTINUED | COMMUNITY
Start: 2021-08-26 | End: 2022-10-10

## 2022-10-10 RX ORDER — CYCLOBENZAPRINE HYDROCHLORIDE 5 MG/1
5 TABLET, FILM COATED ORAL 3 TIMES DAILY
Qty: 30 | Refills: 0 | Status: DISCONTINUED | COMMUNITY
Start: 2019-09-12 | End: 2022-10-10

## 2022-10-10 RX ORDER — ALPRAZOLAM 0.25 MG/1
0.25 TABLET ORAL
Qty: 15 | Refills: 0 | Status: DISCONTINUED | COMMUNITY
Start: 2019-07-01 | End: 2022-10-10

## 2022-10-10 RX ORDER — METHYLPREDNISOLONE 4 MG/1
4 TABLET ORAL
Qty: 1 | Refills: 0 | Status: DISCONTINUED | COMMUNITY
Start: 2019-09-19 | End: 2022-10-10

## 2022-10-10 RX ORDER — METHYLPREDNISOLONE 40 MG/ML
40 INJECTION, POWDER, LYOPHILIZED, FOR SOLUTION INTRAMUSCULAR; INTRAVENOUS
Qty: 0.5 | Refills: 0 | Status: DISCONTINUED | COMMUNITY
Start: 2020-05-21 | End: 2022-10-10

## 2022-10-10 RX ORDER — IBUPROFEN 800 MG/1
800 TABLET, FILM COATED ORAL
Qty: 21 | Refills: 0 | Status: DISCONTINUED | COMMUNITY
Start: 2019-09-09 | End: 2022-10-10

## 2022-10-10 RX ORDER — TRAMADOL HYDROCHLORIDE 50 MG/1
50 TABLET, COATED ORAL
Qty: 15 | Refills: 0 | Status: DISCONTINUED | COMMUNITY
Start: 2019-09-19 | End: 2022-10-10

## 2022-10-10 RX ORDER — DICLOFENAC SODIUM 1% 10 MG/G
1 GEL TOPICAL
Qty: 1 | Refills: 2 | Status: DISCONTINUED | COMMUNITY
Start: 2021-05-10 | End: 2022-10-10

## 2022-10-10 NOTE — PHYSICAL EXAM
[Alert] : alert [Normal Voice/Communication] : normal voice/communication [Healthy Appearing] : healthy appearing [No Acute Distress] : no acute distress [Sclera] : the sclera and conjunctiva were normal [Hearing Threshold Finger Rub Not [place]] : hearing was normal [Normal Lips/Gums] : the lips and gums were normal [Oropharynx] : the oropharynx was normal [Normal Appearance] : the appearance of the neck was normal [No Neck Mass] : no neck mass was observed [No Respiratory Distress] : no respiratory distress [No Acc Muscle Use] : no accessory muscle use [Respiration, Rhythm And Depth] : normal respiratory rhythm and effort [Auscultation Breath Sounds / Voice Sounds] : lungs were clear to auscultation bilaterally [Heart Rate And Rhythm] : heart rate was normal and rhythm regular [Normal S1, S2] : normal S1 and S2 [Murmurs] : no murmurs [Bowel Sounds] : normal bowel sounds [Abdomen Tenderness] : non-tender [No Masses] : no abdominal mass palpated [Abdomen Soft] : soft [] : no hepatosplenomegaly [Normal Sphincter Tone] : normal sphincter tone [No External Hemorrhoid] : no external hemorrhoids [No Rectal Mass] : no rectal mass [Chaperone Present: ____] : chaperone present: [unfilled] [Oriented To Time, Place, And Person] : oriented to person, place, and time [Occult Blood] : negative occult blood

## 2022-10-10 NOTE — REASON FOR VISIT
[Consultation] : a consultation visit [FreeTextEntry1] : colon cancer screening, personal h/o colon polyps

## 2022-10-10 NOTE — ASSESSMENT
[FreeTextEntry1] : Patient is a 57 year female, with PMH Hashimoto's thyroiditis, who presents for colon cancer screening. \par \par Patient had a colonoscopy in 2019 with Dr. 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 Pathology states polyp was 5mm in size but we do not have the colonoscopy report to review. Patient denies a family h/o colon polyps or colon cancer. \par \par Colonoscopy to be scheduled. I have discussed the indications, risks and benefits of procedure with patient. Risks include, but not limited to, bleeding, perforation, infection, and reaction to anesthesia. Alternatives to colonoscopy discussed with patient. Patient was given the opportunity to ask questions, all questions were answered. The patient agrees to proceed with colonoscopy. Patient is medically optimized for colonoscopy. \par \par Miralax prep to be used. Bowel prep instructions discussed at length. \par \par \par I, Dr Keiko jimenes , was present for the for the history and physical.  I performed a rectal exam myself.  I agree with the above assessment and plan

## 2022-10-10 NOTE — ADDENDUM
[FreeTextEntry1] : I, Ny Velarde PA-C, acted as a scribe for the services dictated to me by JAKE Guevara in this document on Oct 10, 2022 for REBECCA ESTEVEZ .\par

## 2023-01-18 ENCOUNTER — TRANSCRIPTION ENCOUNTER (OUTPATIENT)
Age: 58
End: 2023-01-18

## 2023-01-18 LAB — SARS-COV-2 N GENE NPH QL NAA+PROBE: NOT DETECTED

## 2023-01-19 ENCOUNTER — APPOINTMENT (OUTPATIENT)
Dept: GASTROENTEROLOGY | Facility: GI CENTER | Age: 58
End: 2023-01-19
Payer: COMMERCIAL

## 2023-01-19 ENCOUNTER — OUTPATIENT (OUTPATIENT)
Dept: OUTPATIENT SERVICES | Facility: HOSPITAL | Age: 58
LOS: 1 days | End: 2023-01-19
Payer: COMMERCIAL

## 2023-01-19 DIAGNOSIS — Z12.11 ENCOUNTER FOR SCREENING FOR MALIGNANT NEOPLASM OF COLON: ICD-10-CM

## 2023-01-19 PROCEDURE — G0105: CPT

## 2023-01-19 PROCEDURE — 45378 DIAGNOSTIC COLONOSCOPY: CPT | Mod: 33

## 2023-01-19 NOTE — HISTORY OF PRESENT ILLNESS
[FreeTextEntry1] : hx of colon polyps\par  I discussed the risks and benefits of colonoscopy and patient was given opportunity to ask questions. Colonoscopy to r/o colon cancer, polyps, AVM's. Patient is medically optimized for the procedure\par

## 2023-01-24 ENCOUNTER — APPOINTMENT (OUTPATIENT)
Dept: OBGYN | Facility: CLINIC | Age: 58
End: 2023-01-24
Payer: COMMERCIAL

## 2023-01-24 ENCOUNTER — NON-APPOINTMENT (OUTPATIENT)
Age: 58
End: 2023-01-24

## 2023-01-24 VITALS
WEIGHT: 175 LBS | DIASTOLIC BLOOD PRESSURE: 83 MMHG | BODY MASS INDEX: 31.01 KG/M2 | SYSTOLIC BLOOD PRESSURE: 124 MMHG | HEIGHT: 63 IN

## 2023-01-24 DIAGNOSIS — R68.82 DECREASED LIBIDO: ICD-10-CM

## 2023-01-24 DIAGNOSIS — R23.2 FLUSHING: ICD-10-CM

## 2023-01-24 PROCEDURE — 99212 OFFICE O/P EST SF 10 MIN: CPT | Mod: 25

## 2023-01-24 PROCEDURE — 99396 PREV VISIT EST AGE 40-64: CPT

## 2023-01-27 LAB
CYTOLOGY CVX/VAG DOC THIN PREP: ABNORMAL
HPV HIGH+LOW RISK DNA PNL CVX: NOT DETECTED

## 2023-01-30 PROBLEM — R23.2 HOT FLASHES: Status: ACTIVE | Noted: 2023-01-30

## 2023-01-30 PROBLEM — R68.82 LOW LIBIDO: Status: ACTIVE | Noted: 2023-01-30

## 2023-01-30 NOTE — PLAN
[FreeTextEntry1] : 57 year old female wwe\par \par 1. Pap done\par 2. Rx screening mammo and sono (history of dense breast tissue)\par 3. Counseled on menopausal symptoms.  We discussed hot flashes.  We discussed the etiology of hot flashes.  The patient is interested in treatment options for hot flashes.  We discussed over-the-counter relezin.  Counseling and information was given.  We discussed it can take 2 to 3 months to see results.  If the patient does not get any results she can return to the office to discuss other treatment options.  We also discussed low libido.  The etiology of low libido was reviewed with the patient.  ACOG recommendations for low libido were reviewed.  We also discussed treatment options such as ristela.  She is interested in trying ristela.  Information was given.  She is aware that it can take 2 to 3 months to see results.  We also discussed the meet [employer] estiven.  She is not interested in seeing a sex therapist at this time.\par 4. Up to date with colonoscopy\par 5. Annual exam in 1 year

## 2023-01-30 NOTE — HISTORY OF PRESENT ILLNESS
[FreeTextEntry1] : 57 year old female presents for wwe.  Menarche was at age 15.  LMP was in 2021.  She is now in menopause.  She had mild hot flashes last year but states they are now more bothersome.  She is interested in discussing treatment options for hot flashes today.  She is also complaining of weight gain and low libido.  She is interested in discussing treatment options for low libido.  She denies vaginal dryness.  She has no history of ovarian cysts, fibroids, endometriosis, STI's or abnormal pap's.  She has a history of a breast biopsy in  that was benign.  She is due for a mammo and breast sono.  She is up to date with her colonoscopy.  \par \par PMH is significant for hashimoto's thyroiditis and psoriatic arthritis.  PSH is significant for a shoulder surgery and D&C.  Her family history os non-contributory.  She denies tobacco, alcohol and illicit drugs.  Gyn history as above.  OB history:  (FT VD x2, SAB x1, Ectopic x1).  NKDA.  she takes otezla, synthroid, cytomel and vitamin D.

## 2023-04-27 ENCOUNTER — APPOINTMENT (OUTPATIENT)
Dept: DERMATOLOGY | Facility: CLINIC | Age: 58
End: 2023-04-27
Payer: COMMERCIAL

## 2023-04-27 PROCEDURE — 99203 OFFICE O/P NEW LOW 30 MIN: CPT

## 2024-02-06 ENCOUNTER — APPOINTMENT (OUTPATIENT)
Dept: OBGYN | Facility: CLINIC | Age: 59
End: 2024-02-06

## 2024-02-15 ENCOUNTER — APPOINTMENT (OUTPATIENT)
Dept: OBGYN | Facility: CLINIC | Age: 59
End: 2024-02-15
Payer: COMMERCIAL

## 2024-02-15 VITALS
HEIGHT: 63 IN | BODY MASS INDEX: 30.48 KG/M2 | DIASTOLIC BLOOD PRESSURE: 85 MMHG | SYSTOLIC BLOOD PRESSURE: 132 MMHG | WEIGHT: 172 LBS

## 2024-02-15 DIAGNOSIS — Z01.419 ENCOUNTER FOR GYNECOLOGICAL EXAMINATION (GENERAL) (ROUTINE) W/OUT ABNORMAL FINDINGS: ICD-10-CM

## 2024-02-15 PROCEDURE — 99396 PREV VISIT EST AGE 40-64: CPT

## 2024-02-15 NOTE — HISTORY OF PRESENT ILLNESS
[FreeTextEntry1] : 58 year old female presents for wwe.  Menarche was at age 15.  LMP was in 2021.  She is now in menopause.  She has hot flashes but states they have gotten better over the last year.  She denies vaginal dryness.  She has no history of ovarian cysts, fibroids, endometriosis, STI's or abnormal pap's.  She has a history of a breast biopsy in  that was benign.  She is due for a mammo and breast sono.  She is up to date with her colonoscopy.    PMH is significant for hashimoto's thyroiditis.  PSH is significant for a shoulder surgery and D&C.  Her family history os non-contributory.  She denies tobacco, alcohol and illicit drugs.  Gyn history as above.  OB history:  (FT VD x2, SAB x1, Ectopic x1).  NKDA.  She takes synthroid, and vitamin D.

## 2024-02-15 NOTE — PLAN
[FreeTextEntry1] : 58 year old female wwe  1. Pap done 2. Rx screening mammo and sono (history of dense breast tissue) 3. Counseled on menopausal symptoms.  Hot flashes have gotten better over the last year without medications.  She is still complaining of low libido but states it is less bothersome.  She is not interested in any other treatment options for low libido at this time. 4. Up to date with colonoscopy 5. Annual exam in 1 year

## 2024-02-20 DIAGNOSIS — Z12.31 ENCOUNTER FOR SCREENING MAMMOGRAM FOR MALIGNANT NEOPLASM OF BREAST: ICD-10-CM

## 2024-02-22 LAB
CYTOLOGY CVX/VAG DOC THIN PREP: ABNORMAL
HPV HIGH+LOW RISK DNA PNL CVX: NOT DETECTED

## 2024-06-24 ENCOUNTER — APPOINTMENT (OUTPATIENT)
Dept: DERMATOLOGY | Facility: CLINIC | Age: 59
End: 2024-06-24
Payer: COMMERCIAL

## 2024-06-24 ENCOUNTER — RESULT REVIEW (OUTPATIENT)
Age: 59
End: 2024-06-24

## 2024-06-24 PROCEDURE — 99213 OFFICE O/P EST LOW 20 MIN: CPT

## 2024-07-04 ENCOUNTER — NON-APPOINTMENT (OUTPATIENT)
Age: 59
End: 2024-07-04

## 2024-10-28 ENCOUNTER — APPOINTMENT (OUTPATIENT)
Dept: ORTHOPEDIC SURGERY | Facility: CLINIC | Age: 59
End: 2024-10-28
Payer: COMMERCIAL

## 2024-10-28 VITALS — BODY MASS INDEX: 30.48 KG/M2 | WEIGHT: 172 LBS | HEIGHT: 63 IN

## 2024-10-28 DIAGNOSIS — M54.50 LOW BACK PAIN, UNSPECIFIED: ICD-10-CM

## 2024-10-28 DIAGNOSIS — M53.3 SACROCOCCYGEAL DISORDERS, NOT ELSEWHERE CLASSIFIED: ICD-10-CM

## 2024-10-28 PROCEDURE — 99203 OFFICE O/P NEW LOW 30 MIN: CPT | Mod: 25

## 2024-10-28 PROCEDURE — 72220 X-RAY EXAM SACRUM TAILBONE: CPT | Mod: NC

## 2024-10-28 PROCEDURE — 72100 X-RAY EXAM L-S SPINE 2/3 VWS: CPT

## 2024-10-28 PROCEDURE — 72200 X-RAY EXAM SI JOINTS: CPT

## 2024-10-28 RX ORDER — MELOXICAM 15 MG/1
15 TABLET ORAL
Qty: 30 | Refills: 1 | Status: ACTIVE | COMMUNITY
Start: 2024-10-28 | End: 1900-01-01

## 2024-12-02 ENCOUNTER — APPOINTMENT (OUTPATIENT)
Dept: ORTHOPEDIC SURGERY | Facility: CLINIC | Age: 59
End: 2024-12-02

## 2025-01-16 ENCOUNTER — APPOINTMENT (OUTPATIENT)
Dept: DERMATOLOGY | Facility: CLINIC | Age: 60
End: 2025-01-16

## 2025-02-25 ENCOUNTER — APPOINTMENT (OUTPATIENT)
Dept: OBGYN | Facility: CLINIC | Age: 60
End: 2025-02-25
Payer: COMMERCIAL

## 2025-02-25 VITALS
SYSTOLIC BLOOD PRESSURE: 113 MMHG | BODY MASS INDEX: 22.86 KG/M2 | HEIGHT: 63 IN | DIASTOLIC BLOOD PRESSURE: 74 MMHG | WEIGHT: 129 LBS

## 2025-02-25 DIAGNOSIS — Z78.0 ENCOUNTER FOR SCREENING FOR OSTEOPOROSIS: ICD-10-CM

## 2025-02-25 DIAGNOSIS — Z01.419 ENCOUNTER FOR GYNECOLOGICAL EXAMINATION (GENERAL) (ROUTINE) W/OUT ABNORMAL FINDINGS: ICD-10-CM

## 2025-02-25 DIAGNOSIS — R92.323 MAMMOGRAPHIC FIBROGLANDULAR DENSITY, BILATERAL BREASTS: ICD-10-CM

## 2025-02-25 DIAGNOSIS — Z13.820 ENCOUNTER FOR SCREENING FOR OSTEOPOROSIS: ICD-10-CM

## 2025-02-25 DIAGNOSIS — Z12.31 ENCOUNTER FOR SCREENING MAMMOGRAM FOR MALIGNANT NEOPLASM OF BREAST: ICD-10-CM

## 2025-02-25 PROCEDURE — 99396 PREV VISIT EST AGE 40-64: CPT

## 2025-02-27 LAB — HPV HIGH+LOW RISK DNA PNL CVX: NOT DETECTED

## 2025-03-03 LAB — CYTOLOGY CVX/VAG DOC THIN PREP: ABNORMAL

## 2025-03-31 ENCOUNTER — APPOINTMENT (OUTPATIENT)
Dept: ORTHOPEDIC SURGERY | Facility: CLINIC | Age: 60
End: 2025-03-31
Payer: COMMERCIAL

## 2025-03-31 VITALS
HEART RATE: 72 BPM | SYSTOLIC BLOOD PRESSURE: 118 MMHG | WEIGHT: 127 LBS | HEIGHT: 63 IN | BODY MASS INDEX: 22.5 KG/M2 | DIASTOLIC BLOOD PRESSURE: 78 MMHG

## 2025-03-31 DIAGNOSIS — M17.11 UNILATERAL PRIMARY OSTEOARTHRITIS, RIGHT KNEE: ICD-10-CM

## 2025-03-31 PROCEDURE — 20610 DRAIN/INJ JOINT/BURSA W/O US: CPT | Mod: RT

## 2025-03-31 PROCEDURE — 73564 X-RAY EXAM KNEE 4 OR MORE: CPT | Mod: RT

## 2025-03-31 PROCEDURE — 99204 OFFICE O/P NEW MOD 45 MIN: CPT | Mod: 25

## 2025-03-31 RX ORDER — ALPRAZOLAM 0.5 MG/1
0.5 TABLET ORAL
Qty: 2 | Refills: 0 | Status: ACTIVE | COMMUNITY
Start: 2025-03-31 | End: 1900-01-01

## (undated) DEVICE — STERIS DEFENDO 3-PIECE KIT (AIR/WATER, SUCTION & BIOPSY VALVES)